# Patient Record
Sex: MALE | Race: WHITE
[De-identification: names, ages, dates, MRNs, and addresses within clinical notes are randomized per-mention and may not be internally consistent; named-entity substitution may affect disease eponyms.]

---

## 2018-05-16 ENCOUNTER — HOSPITAL ENCOUNTER (OUTPATIENT)
Dept: HOSPITAL 74 - FASU | Age: 73
Discharge: HOME | End: 2018-05-16
Attending: OPHTHALMOLOGY
Payer: COMMERCIAL

## 2018-05-16 VITALS — TEMPERATURE: 97.8 F | DIASTOLIC BLOOD PRESSURE: 74 MMHG | SYSTOLIC BLOOD PRESSURE: 116 MMHG

## 2018-05-16 VITALS — HEART RATE: 65 BPM

## 2018-05-16 VITALS — BODY MASS INDEX: 29.2 KG/M2

## 2018-05-16 DIAGNOSIS — H26.8: Primary | ICD-10-CM

## 2018-05-16 PROCEDURE — 08RK3JZ REPLACEMENT OF LEFT LENS WITH SYNTHETIC SUBSTITUTE, PERCUTANEOUS APPROACH: ICD-10-PCS | Performed by: OPHTHALMOLOGY

## 2018-05-16 RX ADMIN — TROPICAMIDE ONE DROP: 10 SOLUTION/ DROPS OPHTHALMIC at 08:00

## 2018-05-16 RX ADMIN — PHENYLEPHRINE HYDROCHLORIDE ONE DROP: 0.25 SPRAY NASAL at 08:10

## 2018-05-16 RX ADMIN — TROPICAMIDE ONE DROP: 10 SOLUTION/ DROPS OPHTHALMIC at 08:10

## 2018-05-16 RX ADMIN — CYCLOPENTOLATE HYDROCHLORIDE ONE DROP: 20 SOLUTION/ DROPS OPHTHALMIC at 08:00

## 2018-05-16 RX ADMIN — PHENYLEPHRINE HYDROCHLORIDE ONE DROP: 0.25 SPRAY NASAL at 08:00

## 2018-05-16 RX ADMIN — CYCLOPENTOLATE HYDROCHLORIDE ONE DROP: 20 SOLUTION/ DROPS OPHTHALMIC at 08:05

## 2018-05-16 RX ADMIN — CIPROFLOXACIN HYDROCHLORIDE ONE DROP: 3 SOLUTION/ DROPS OPHTHALMIC at 08:05

## 2018-05-16 RX ADMIN — CYCLOPENTOLATE HYDROCHLORIDE ONE DROP: 20 SOLUTION/ DROPS OPHTHALMIC at 08:10

## 2018-05-16 RX ADMIN — PHENYLEPHRINE HYDROCHLORIDE ONE DROP: 0.25 SPRAY NASAL at 08:05

## 2018-05-16 RX ADMIN — TROPICAMIDE ONE DROP: 10 SOLUTION/ DROPS OPHTHALMIC at 08:05

## 2018-05-16 RX ADMIN — CIPROFLOXACIN HYDROCHLORIDE ONE DROP: 3 SOLUTION/ DROPS OPHTHALMIC at 08:00

## 2018-05-16 RX ADMIN — CIPROFLOXACIN HYDROCHLORIDE ONE DROP: 3 SOLUTION/ DROPS OPHTHALMIC at 08:10

## 2018-05-16 NOTE — OP
DATE OF OPERATION:  05/16/2018

 

OPERATIVE PROCEDURE:  Lens Phacoemulsification with Posterior Chamber Intraocular

Lens Placement Left Eye

 

PREOPERATIVE DIAGNOSIS:  Visually Significant Cataract of Left Eye

 

POSTOPERATIVE DIAGNOSIS:  Visually Significant Cataract of Left Eye

 

SURGEON:  Derick Luna M.D.

 

ANESTHESIA:  MAC

 

PROCEDURE:  The patient was brought to the operating room and placed under monitored

anesthesia care by Anesthesia.  A drop of Tetracaine was then placed over the left

eye.  The patient was then prepped and draped in the usual sterile manner.  A

speculum was then placed over the left eye. The eye was then well irrigated with

copious amounts of BSS (balanced salt solution). 

 

The operating microscope was then moved into position.  A paracentesis was performed

using a 15 degree blade.  At this point 0.5 mL of 1% preservative-free lidocaine was

injected into the anterior chamber.  Amvisc plus was then injected into the anterior

chamber.  A clear corneal incision was then formed using a 2.2 mm keratome. A

capsulorrhexis was then performed in a continuous circular fashion beginning with a

cystotome, completed with an Utratas forceps. Hydrodissection was then performed

using BSS on a cannula. The phaco probe was then introduced through the corneal wound

and the cataract was removed using the phaco chop technique.  Approximately 3 seconds

of absolute phaco time was used.  The remaining cortex was then removed using

irrigation and aspiration with an I/A probe. The capsule was then filled with regular

Amvisc and the capsule was noted to be intact.  A previously selected foldable

posterior chamber intraocular lens was then injected into the capsule through the

corneal wound using a lens injector.  It was then dialed into position using a

Sinskey hook.  The Amvisc was then removed using irrigation and aspiration.  Miostat

was then injected through the paracentesis to constrict the pupil.  The paracentesis

and corneal wound were then hydrated and noted to be water tight. A drop of Maxitrol

was then placed over the eye.  The speculum was removed and clear shield was taped

over the eye. 

 

The patient tolerated the procedure well and there were no surgical complications.

The patient was asked to follow up in my office the next day.

 

 

DERICK LUNA M.D.

 

[id number]

DD: 05/16/2018 09:59

DT: 05/16/2018 10:33

Job #:  [id number]

## 2018-05-30 ENCOUNTER — HOSPITAL ENCOUNTER (OUTPATIENT)
Dept: HOSPITAL 74 - FASU | Age: 73
Discharge: HOME | End: 2018-05-30
Attending: OPHTHALMOLOGY
Payer: COMMERCIAL

## 2018-05-30 VITALS — SYSTOLIC BLOOD PRESSURE: 119 MMHG | HEART RATE: 64 BPM | DIASTOLIC BLOOD PRESSURE: 68 MMHG

## 2018-05-30 VITALS — BODY MASS INDEX: 29.2 KG/M2

## 2018-05-30 VITALS — TEMPERATURE: 98.1 F

## 2018-05-30 DIAGNOSIS — H26.8: Primary | ICD-10-CM

## 2018-05-30 PROCEDURE — 08RJ3JZ REPLACEMENT OF RIGHT LENS WITH SYNTHETIC SUBSTITUTE, PERCUTANEOUS APPROACH: ICD-10-PCS | Performed by: OPHTHALMOLOGY

## 2018-05-30 RX ADMIN — CYCLOPENTOLATE HYDROCHLORIDE ONE DROP: 20 SOLUTION/ DROPS OPHTHALMIC at 08:05

## 2018-05-30 RX ADMIN — CYCLOPENTOLATE HYDROCHLORIDE ONE DROP: 20 SOLUTION/ DROPS OPHTHALMIC at 08:10

## 2018-05-30 RX ADMIN — TROPICAMIDE ONE DROP: 10 SOLUTION/ DROPS OPHTHALMIC at 08:05

## 2018-05-30 RX ADMIN — TROPICAMIDE ONE DROP: 10 SOLUTION/ DROPS OPHTHALMIC at 08:00

## 2018-05-30 RX ADMIN — PHENYLEPHRINE HYDROCHLORIDE ONE DROP: 0.25 SPRAY NASAL at 08:00

## 2018-05-30 RX ADMIN — TROPICAMIDE ONE DROP: 10 SOLUTION/ DROPS OPHTHALMIC at 08:10

## 2018-05-30 RX ADMIN — CIPROFLOXACIN HYDROCHLORIDE ONE DROP: 3 SOLUTION/ DROPS OPHTHALMIC at 08:00

## 2018-05-30 RX ADMIN — PHENYLEPHRINE HYDROCHLORIDE ONE DROP: 0.25 SPRAY NASAL at 08:05

## 2018-05-30 RX ADMIN — CIPROFLOXACIN HYDROCHLORIDE ONE DROP: 3 SOLUTION/ DROPS OPHTHALMIC at 08:10

## 2018-05-30 RX ADMIN — CIPROFLOXACIN HYDROCHLORIDE ONE DROP: 3 SOLUTION/ DROPS OPHTHALMIC at 08:05

## 2018-05-30 RX ADMIN — PHENYLEPHRINE HYDROCHLORIDE ONE DROP: 0.25 SPRAY NASAL at 08:10

## 2018-05-30 RX ADMIN — CYCLOPENTOLATE HYDROCHLORIDE ONE DROP: 20 SOLUTION/ DROPS OPHTHALMIC at 08:00

## 2018-05-30 NOTE — OP
DATE OF OPERATION:  05/30/2018

 

OPERATIVE PROCEDURE:  Lens phacoemulsification with posterior chamber intraocular

lens placement, right eye.

 

PREOPERATIVE DIAGNOSIS: Visually significant cataract of right eye.

 

POSTOPERATIVE DIAGNOSIS:  Visually significant cataract of right eye.

 

SURGEON:  Derick Luna MD

 

ANESTHESIA:  MAC.

 

PROCEDURE:  The patient was brought to the operating room and placed under monitored

anesthesia care by Anesthesia.  A drop of tetracaine was then placed over the right

eye.  The patient was then prepped and draped in the usual sterile manner.  A

speculum was then placed over the right eye.  The eye was then well irrigated with

copious amounts of BSS (balanced salt solution). 

 

The operating microscope was then moved into position.  A paracentesis was performed

using a 15-degree blade.  At this point 0.5 mL of 1% preservative-free lidocaine was

injected into the anterior chamber.  Amvisc Plus was then injected into the anterior

chamber.  A clear corneal incision was then formed using a 2.2-mm keratome.  A

capsulorrhexis was then performed in a continuous circular fashion beginning with a

cystotome completed with an Utratas forceps. Hydrodissection was then performed using

BSS on a cannula.  The phaco probe was then introduced through the corneal wound and

the cataract was removed using the phaco chop technique.  Approximately 3 seconds of

absolute phaco time was used.  The remaining cortex was then removed using irrigation

and aspiration with an I/A probe.

 

he capsule was then filled with regular Amvisc and the capsule was noted to be

intact.  A previously selected foldable posterior chamber intraocular lens was then

injected into the capsule through the corneal wound using a lens injector.  It was

then dialed into position using a Sinskey hook.  The Amvisc was then removed using

irrigation and aspiration.  Miostat was then injected through the paracentesis to

constrict the pupil.  The paracentesis and corneal wound were then hydrated and noted

to be watertight.  A drop of Maxitrol was then placed over the eye.  The speculum was

removed and clear shield was taped over the eye. 

 

The patient tolerated the procedure well and there were no surgical complications. 

The patient was asked to follow up in my office the next day.

 

 

DERICK LUNA M.D.

 

[id number]

DD: 05/30/2018 10:19

DT: 05/30/2018 11:07

Job #:  [id number]

## 2021-09-01 ENCOUNTER — HOSPITAL ENCOUNTER (OUTPATIENT)
Dept: HOSPITAL 74 - FASU-ENDO | Age: 76
Discharge: HOME | End: 2021-09-01
Attending: INTERNAL MEDICINE
Payer: COMMERCIAL

## 2021-09-01 VITALS — DIASTOLIC BLOOD PRESSURE: 74 MMHG | TEMPERATURE: 97.5 F | SYSTOLIC BLOOD PRESSURE: 113 MMHG

## 2021-09-01 VITALS — BODY MASS INDEX: 29.5 KG/M2

## 2021-09-01 VITALS — HEART RATE: 64 BPM

## 2021-09-01 DIAGNOSIS — Z86.010: Primary | ICD-10-CM

## 2021-09-01 PROCEDURE — 0DJD8ZZ INSPECTION OF LOWER INTESTINAL TRACT, VIA NATURAL OR ARTIFICIAL OPENING ENDOSCOPIC: ICD-10-PCS | Performed by: INTERNAL MEDICINE

## 2021-09-03 PROBLEM — Z00.00 ENCOUNTER FOR PREVENTIVE HEALTH EXAMINATION: Status: ACTIVE | Noted: 2021-09-03

## 2021-09-09 ENCOUNTER — APPOINTMENT (OUTPATIENT)
Dept: CARDIOLOGY | Facility: CLINIC | Age: 76
End: 2021-09-09
Payer: MEDICARE

## 2021-09-09 VITALS
SYSTOLIC BLOOD PRESSURE: 142 MMHG | HEIGHT: 72 IN | WEIGHT: 218 LBS | OXYGEN SATURATION: 96 % | BODY MASS INDEX: 29.53 KG/M2 | DIASTOLIC BLOOD PRESSURE: 78 MMHG | HEART RATE: 75 BPM

## 2021-09-09 DIAGNOSIS — Z83.3 FAMILY HISTORY OF DIABETES MELLITUS: ICD-10-CM

## 2021-09-09 DIAGNOSIS — Z82.49 FAMILY HISTORY OF ISCHEMIC HEART DISEASE AND OTHER DISEASES OF THE CIRCULATORY SYSTEM: ICD-10-CM

## 2021-09-09 DIAGNOSIS — Z78.9 OTHER SPECIFIED HEALTH STATUS: ICD-10-CM

## 2021-09-09 DIAGNOSIS — Z87.438 PERSONAL HISTORY OF OTHER DISEASES OF MALE GENITAL ORGANS: ICD-10-CM

## 2021-09-09 PROCEDURE — 93000 ELECTROCARDIOGRAM COMPLETE: CPT

## 2021-09-09 PROCEDURE — 99204 OFFICE O/P NEW MOD 45 MIN: CPT

## 2021-09-11 ENCOUNTER — NON-APPOINTMENT (OUTPATIENT)
Age: 76
End: 2021-09-11

## 2021-09-11 PROBLEM — Z78.9 SOCIAL ALCOHOL USE: Status: ACTIVE | Noted: 2021-09-11

## 2021-09-11 PROBLEM — Z87.438 HISTORY OF BENIGN PROSTATIC HYPERPLASIA: Status: RESOLVED | Noted: 2021-09-11 | Resolved: 2021-09-11

## 2021-09-11 PROBLEM — Z82.49 FAMILY HISTORY OF PERIPHERAL VASCULAR DISEASE: Status: ACTIVE | Noted: 2021-09-11

## 2021-09-11 PROBLEM — Z82.49 FAMILY HISTORY OF CONGESTIVE HEART FAILURE: Status: ACTIVE | Noted: 2021-09-11

## 2021-09-11 PROBLEM — Z82.49 FAMILY HISTORY OF ACUTE MYOCARDIAL INFARCTION: Status: ACTIVE | Noted: 2021-09-11

## 2021-09-11 PROBLEM — Z83.3 FAMILY HISTORY OF DIABETES MELLITUS: Status: ACTIVE | Noted: 2021-09-11

## 2021-09-11 RX ORDER — ATORVASTATIN CALCIUM 80 MG/1
TABLET, FILM COATED ORAL
Refills: 0 | Status: ACTIVE | COMMUNITY

## 2021-09-11 RX ORDER — MULTIVITAMIN
TABLET ORAL
Refills: 0 | Status: ACTIVE | COMMUNITY

## 2021-09-11 RX ORDER — OMEGA-3/DHA/EPA/FISH OIL 300-1000MG
1000 CAPSULE,DELAYED RELEASE (ENTERIC COATED) ORAL
Refills: 0 | Status: ACTIVE | COMMUNITY

## 2021-09-29 ENCOUNTER — APPOINTMENT (OUTPATIENT)
Dept: CARDIOLOGY | Facility: CLINIC | Age: 76
End: 2021-09-29
Payer: MEDICARE

## 2021-09-29 PROCEDURE — 93015 CV STRESS TEST SUPVJ I&R: CPT

## 2021-09-30 ENCOUNTER — APPOINTMENT (OUTPATIENT)
Dept: CARDIOLOGY | Facility: CLINIC | Age: 76
End: 2021-09-30
Payer: MEDICARE

## 2021-09-30 ENCOUNTER — NON-APPOINTMENT (OUTPATIENT)
Age: 76
End: 2021-09-30

## 2021-09-30 PROCEDURE — 93306 TTE W/DOPPLER COMPLETE: CPT

## 2021-10-03 NOTE — REVIEW OF SYSTEMS
[Feeling Fatigued] : feeling fatigued [Urinary Frequency] : urinary frequency [Joint Pain] : joint pain [Negative] : Heme/Lymph [FreeTextEntry5] : see history of present illness [FreeTextEntry7] : Recent colonoscopy reportedly unremarkable.

## 2021-10-03 NOTE — HISTORY OF PRESENT ILLNESS
[FreeTextEntry1] : 76-year-old man\par Cardiology consultation requested by Dr. Kirkpatrick\par \par Mr. Moss has no known heart disease. There is no history of myocardial infarction angina or congestive heart failure.\par \par Doe does experience occasional dyspnea on exertion. However the symptoms are not progressive or severe. No chest pain. No palpitations No syncope.\par \par \par There is a previous history of hyper lipidemia. There is no history of diabetes hypertension or smoking. His father had  a myocardial infarction ,congestive heart failure and required a pacemaker.His mother had peripheral vascular disease\par \par Mr. Moss presents today for cardiovascular evaluation. He is accompanied by his wife.\par \par Mr. Moss is planning to see Dr. DAMIAN Hillman as a new primary care physician/internist

## 2021-10-03 NOTE — PHYSICAL EXAM
[Normal Conjunctiva] : normal conjunctiva [Normal S1, S2] : normal S1, S2 [Clear Lung Fields] : clear lung fields [Soft] : abdomen soft [Non Tender] : non-tender [Normal Bowel Sounds] : normal bowel sounds [Normal Gait] : normal gait [No Rash] : no rash [No Focal Deficits] : no focal deficits [de-identified] : Appears in no distress lying flat [de-identified] : normocephalic [de-identified] : No neck vein distention. No carotid bruit [de-identified] : 1/6 systolic ejection murmur at the base. No gallop  no diastolic sounds. [de-identified] : Full range of motion [de-identified] : No edema. Dorsalis pedis pulses +2 bilaterally. Feet warm and well-perfused. No ulcerations [de-identified] : pleasant

## 2021-10-03 NOTE — DISCUSSION/SUMMARY
[FreeTextEntry1] : Hyperlipidemia\par Hyperlipidemia represents a risk factor for atherosclerotic heart disease. There is however,  no evidence of such to date. The resting 9/09/21 the electrocardiogram shows no evidence of myocardial ischemia or infarction. The target LDL level for primary prevention is about 100. HMG-CoA reductase inhibitor therapy has been effective. In 9/21 the serum cholesterol level was 155 HDL 43 triglycerides 148 and LDL 87. Nonpharmacological therapy, specifically diet exercise and weight loss are emphasized major aspects of treatment.  In view of risk factors for atherosclerotic heart disease including hyperlipidemia  and family history  screening noninvasive studies would be helpful for management decisions.\par \par I have recommended the following\par a. Low fat low cholesterol diet. Regular aerobic exercise and weight loss\par b. Continue the present medical regimen\par c. Target LDL level to about 100 as discussed above\par d. Noninvasive cardiac evaluation to include\par   1. Echocardiogram\par   2. exercise treadmill ECG study\par \par \par \par \par Shortness of breath\par The working diagnosis is dyspnea on exertion secondary to aging deconditioning and mild obesity. The history is consistent with this diagnosis. The absence of chest pain argues against but does not eliminate myocardial ischemia/atherosclerotic heart disease as a cause for the patient's symptoms. Noninvasive cardiac studies will be helpful for further evaluation.\par \par I have recommended the following\par a. Echocardiogram\par b. Exercise treadmill ECG study.\par \par \par \par \par The diagnosis, prognosis, risks, options and alternatives were explained at length to the patient and family. All questions were answered. Issues discussed included hyperlipidemia family history of heart disease atherosclerotic heart disease hyperlipidemia noninvasive cardiac testing diet and exercise.\par \par Counseling and/or coordination of care\par Time was a significant factor for this patient encounter. Total time spent with the patient and  family was 60 minutes. Greater than 50% of the time was devoted  to counseling and/or coordination of care.

## 2021-10-26 ENCOUNTER — RESULT REVIEW (OUTPATIENT)
Age: 76
End: 2021-10-26

## 2021-10-27 ENCOUNTER — NON-APPOINTMENT (OUTPATIENT)
Age: 76
End: 2021-10-27

## 2022-07-06 ENCOUNTER — NON-APPOINTMENT (OUTPATIENT)
Age: 77
End: 2022-07-06

## 2022-07-06 ENCOUNTER — APPOINTMENT (OUTPATIENT)
Dept: CARDIOLOGY | Facility: CLINIC | Age: 77
End: 2022-07-06

## 2022-07-06 VITALS
SYSTOLIC BLOOD PRESSURE: 110 MMHG | OXYGEN SATURATION: 94 % | DIASTOLIC BLOOD PRESSURE: 70 MMHG | WEIGHT: 212 LBS | BODY MASS INDEX: 28.75 KG/M2 | HEART RATE: 71 BPM

## 2022-07-06 DIAGNOSIS — Z86.69 PERSONAL HISTORY OF OTHER DISEASES OF THE NERVOUS SYSTEM AND SENSE ORGANS: ICD-10-CM

## 2022-07-06 DIAGNOSIS — M50.90 CERVICAL DISC DISORDER, UNSPECIFIED, UNSPECIFIED CERVICAL REGION: ICD-10-CM

## 2022-07-06 PROCEDURE — 93000 ELECTROCARDIOGRAM COMPLETE: CPT

## 2022-07-06 PROCEDURE — 99215 OFFICE O/P EST HI 40 MIN: CPT

## 2022-07-07 PROBLEM — Z86.69 HISTORY OF GLAUCOMA: Status: RESOLVED | Noted: 2022-07-07 | Resolved: 2022-07-07

## 2022-07-07 PROBLEM — M50.90 CERVICAL DISC DISEASE: Status: RESOLVED | Noted: 2022-07-07 | Resolved: 2022-07-07

## 2022-07-07 RX ORDER — LATANOPROST/PF 0.005 %
0.01 DROPS OPHTHALMIC (EYE)
Qty: 2 | Refills: 0 | Status: ACTIVE | COMMUNITY
Start: 2022-05-19

## 2022-07-08 NOTE — DISCUSSION/SUMMARY
[FreeTextEntry1] : Chest pain\par The working diagnosis is musculoskeletal noncardiac chest pain. The history is consistent with this diagnosis. However, the differential diagnosis includes myocardial ischemia/atherosclerotic heart disease. The patient has multiple risk factors for the development of atherosclerotic disease including a strong family history hyperlipidemia and age. A  9/21 exercise treadmill ECG study was abnormal with electrocardiographic evidence of inferolateral myocardial ischemia at peak exercise. This led to a 10/21 stress sestamibi study which revealed normal perfusion.   The degree of aortic stenosis  (9/21 echocardiogram aortic valve area 1.4 cm² ) would t appear  to be inadequate to explain the patient's symptoms Noninvasive cardiac studies at this time would be helpful for management decisions.\par \par I have recommended a followup\par a. Echocardiogram\par b. CT coronary angiogram\par \par \par Valvular heart disease\par The 9/21 echocardiogram revealed moderate aortic stenosis. The peak gradient was 32 mmHg. Mean gradient 21 mmHg aortic valve area 1.4 cm² DVI 0.33. The pulmonary artery systolic pressure was normal at 17 mmHg. The left ventricle ejection fraction was 58%. The present cardiac physical examination is consistent with this degree of aortic stenosis. Echocardiography would be helpful to reassess left ventricular and valvular function.\par \par I have recommended the following\par a. Echocardiogram\par \par \par \par \par \par Hyperlipidemia\par Hyperlipidemia represents a risk factor for atherosclerotic heart disease. There is however,  no evidence of such to date. The resting  7/6/22  electrocardiogram shows no evidence of myocardial ischemia or infarction. The target LDL level for primary prevention is about 100. HMG-CoA reductase inhibitor therapy has been effective. In 9/21 the serum cholesterol level was 155 HDL 43 triglycerides 148 and LDL 87. Nonpharmacological therapy, specifically diet exercise and weight loss are emphasized  as major aspects of treatment.  .\par \par I have recommended the following\par a. Low fat low cholesterol diet. Regular aerobic exercise and weight loss\par b. Continue the present medical regimen\par c. Target LDL level to about 100 as discussed above\par \par \par \par \par \par .\par \par \par \par \par The diagnosis, prognosis, risks, options and alternatives were explained at length to the patient and family. All questions were answered. Issues discussed included  chest pain  hyperlipidemia family history of heart disease atherosclerotic heart disease hyperlipidemia noninvasive cardiac testing diet and exercise.\par \par Counseling and/or coordination of care\par Time was a significant factor for this patient encounter. Total time spent with the patient and  family was 40  minutes. Greater than 50% of the time was devoted  to counseling and/or coordination of care.

## 2022-07-08 NOTE — HISTORY OF PRESENT ILLNESS
[FreeTextEntry1] : 77-year-old man\par Unanticipated visit\sánchez Azar called today with complaints of anterior chest pain. Describes a vague anterior chest discomfort occurring both at rest and exertion. No associated diaphoresis. No dyspnea\par \sánchez Doe is accompanied today by his wife

## 2022-07-08 NOTE — HISTORY OF PRESENT ILLNESS
Call [phone] to establish cardiology follow up within [facility].   [FreeTextEntry1] : 77-year-old man\par Unanticipated visit\sánchez Azar called today with complaints of anterior chest pain. Describes a vague anterior chest discomfort occurring both at rest and exertion. No associated diaphoresis. No dyspnea\par \sánchez Doe is accompanied today by his wife

## 2022-07-08 NOTE — REVIEW OF SYSTEMS
[Feeling Fatigued] : feeling fatigued [Chest Discomfort] : chest discomfort [Joint Pain] : joint pain [Negative] : Heme/Lymph [FreeTextEntry3] : glasses [FreeTextEntry5] : see history of present illness

## 2022-07-08 NOTE — PHYSICAL EXAM
[Normal Conjunctiva] : normal conjunctiva [Normal S1, S2] : normal S1, S2 [Clear Lung Fields] : clear lung fields [Soft] : abdomen soft [Non Tender] : non-tender [Normal Bowel Sounds] : normal bowel sounds [Normal Gait] : normal gait [No Rash] : no rash [No Focal Deficits] : no focal deficits [de-identified] : Appears in no distress lying flat [de-identified] : No neck vein distention. No carotid bruit [de-identified] : normocephalic [de-identified] : 1-2 /6 systolic ejection murmur at the base. No gallop  no diastolic sounds. [de-identified] : Full range of motion [de-identified] : No edema. Dorsalis pedis pulses +2 bilaterally. Feet warm and well-perfused. No ulcerations [de-identified] : pleasant

## 2022-07-08 NOTE — PHYSICAL EXAM
[Normal Conjunctiva] : normal conjunctiva [Clear Lung Fields] : clear lung fields [Normal S1, S2] : normal S1, S2 [Soft] : abdomen soft [Non Tender] : non-tender [Normal Bowel Sounds] : normal bowel sounds [Normal Gait] : normal gait [No Rash] : no rash [No Focal Deficits] : no focal deficits [de-identified] : Appears in no distress lying flat [de-identified] : No neck vein distention. No carotid bruit [de-identified] : normocephalic [de-identified] : 1-2 /6 systolic ejection murmur at the base. No gallop  no diastolic sounds. [de-identified] : Full range of motion [de-identified] : No edema. Dorsalis pedis pulses +2 bilaterally. Feet warm and well-perfused. No ulcerations [de-identified] : pleasant

## 2022-07-21 ENCOUNTER — TRANSCRIPTION ENCOUNTER (OUTPATIENT)
Age: 77
End: 2022-07-21

## 2022-08-01 ENCOUNTER — APPOINTMENT (OUTPATIENT)
Dept: CARDIOLOGY | Facility: CLINIC | Age: 77
End: 2022-08-01

## 2022-08-01 PROCEDURE — 93306 TTE W/DOPPLER COMPLETE: CPT

## 2022-08-01 PROCEDURE — 36415 COLL VENOUS BLD VENIPUNCTURE: CPT

## 2022-08-07 LAB
ANION GAP SERPL CALC-SCNC: 14 MMOL/L
BASOPHILS # BLD AUTO: 0.05 K/UL
BASOPHILS NFR BLD AUTO: 0.7 %
BUN SERPL-MCNC: 17 MG/DL
CALCIUM SERPL-MCNC: 9.7 MG/DL
CHLORIDE SERPL-SCNC: 104 MMOL/L
CHOLEST SERPL-MCNC: 172 MG/DL
CO2 SERPL-SCNC: 22 MMOL/L
CREAT SERPL-MCNC: 1.22 MG/DL
EGFR: 62 ML/MIN/1.73M2
EOSINOPHIL # BLD AUTO: 0.23 K/UL
EOSINOPHIL NFR BLD AUTO: 3.3 %
GLUCOSE SERPL-MCNC: 95 MG/DL
HCT VFR BLD CALC: 45.2 %
HDLC SERPL-MCNC: 49 MG/DL
HGB BLD-MCNC: 14.4 G/DL
IMM GRANULOCYTES NFR BLD AUTO: 0.1 %
LDLC SERPL CALC-MCNC: 96 MG/DL
LYMPHOCYTES # BLD AUTO: 3.59 K/UL
LYMPHOCYTES NFR BLD AUTO: 52.1 %
MAN DIFF?: NORMAL
MCHC RBC-ENTMCNC: 31 PG
MCHC RBC-ENTMCNC: 31.9 GM/DL
MCV RBC AUTO: 97.4 FL
MONOCYTES # BLD AUTO: 0.48 K/UL
MONOCYTES NFR BLD AUTO: 7 %
NEUTROPHILS # BLD AUTO: 2.53 K/UL
NEUTROPHILS NFR BLD AUTO: 36.8 %
NONHDLC SERPL-MCNC: 124 MG/DL
PLATELET # BLD AUTO: 291 K/UL
POTASSIUM SERPL-SCNC: 5.2 MMOL/L
RBC # BLD: 4.64 M/UL
RBC # FLD: 14 %
SODIUM SERPL-SCNC: 141 MMOL/L
TRIGL SERPL-MCNC: 139 MG/DL
WBC # FLD AUTO: 6.89 K/UL

## 2022-08-24 ENCOUNTER — RESULT REVIEW (OUTPATIENT)
Age: 77
End: 2022-08-24

## 2022-08-25 ENCOUNTER — RESULT REVIEW (OUTPATIENT)
Age: 77
End: 2022-08-25

## 2022-08-31 ENCOUNTER — APPOINTMENT (OUTPATIENT)
Dept: CARDIOLOGY | Facility: CLINIC | Age: 77
End: 2022-08-31

## 2022-08-31 VITALS
HEART RATE: 68 BPM | WEIGHT: 214 LBS | DIASTOLIC BLOOD PRESSURE: 68 MMHG | BODY MASS INDEX: 28.99 KG/M2 | HEIGHT: 72 IN | SYSTOLIC BLOOD PRESSURE: 121 MMHG | OXYGEN SATURATION: 96 %

## 2022-08-31 PROCEDURE — 99214 OFFICE O/P EST MOD 30 MIN: CPT

## 2022-09-02 RX ORDER — ASPIRIN 81 MG
81 TABLET, DELAYED RELEASE (ENTERIC COATED) ORAL
Refills: 0 | Status: ACTIVE | COMMUNITY

## 2022-09-02 NOTE — PHYSICAL EXAM
[Normal Conjunctiva] : normal conjunctiva [Normal S1, S2] : normal S1, S2 [Clear Lung Fields] : clear lung fields [Soft] : abdomen soft [Non Tender] : non-tender [Normal Bowel Sounds] : normal bowel sounds [Normal Gait] : normal gait [No Rash] : no rash [No Focal Deficits] : no focal deficits [de-identified] : Appears in no distress lying flat [de-identified] : normocephalic [de-identified] : No neck vein distention. No carotid bruit [de-identified] : 1-2 /6  mid-peaking  systolic ejection murmur at the base. No gallop  no diastolic sounds. [de-identified] : Full range of motion [de-identified] : No edema. Dorsalis pedis pulses +2 bilaterally. Feet warm and well-perfused. No ulcerations [de-identified] : pleasant

## 2022-09-02 NOTE — HISTORY OF PRESENT ILLNESS
[FreeTextEntry1] : 77-year-old man\par Routine followup\par "I feel okay." Doe denies chest pain, shortness of breath, palpitations or syncope. He is physically active without restriction or limitation.\par \par Mr. Moss is accompanied today by his wife

## 2022-09-02 NOTE — DISCUSSION/SUMMARY
[FreeTextEntry1] : \par Atherosclerotic heart disease\par Atherosclerotic heart disease is stable. The 8/22 CT coronary angiogram demonstrated  revealed a total coronary calcium score of 898 Agatston  units representing 90 percentile. The left main had less than 25% stenosis LAD proximal 30-49% mid 50-69% and distal 30-49% left circumflex and RCA had 1-29% proximal stenosis.  FFR (fractional flow reserve) was negative for all vessels. \par  Left ventricular  systolic function is  normal  as reflected by a left ventricular ejection fraction of 61% on the 8/22 echocardiogram. The resting 7/22 electrocardiogram shows no evidence of myocardial ischemia or infarction. In view of the lack of angina, above noninvasive studies and clinical course continued medical management is indicated at this time. Measures to control modifiable risk factors for the development of atherosclerotic disease will be important in long-term management.\par \par \par I have recommended the following\par a. Risk factor modification\par b. Continue  the present medical regimen with the addition of aspirin 81 mg /day\par c. No further cardiac testing for this problem at this time\par \par \par \par Valvular heart disease\par The  8/22  echocardiogram revealed moderate aortic stenosis. The peak gradient was 35 mmHg. Mean gradient 21 mmHg aortic valve area 1.15 cm² DVI 0.28. The pulmonary artery systolic pressure was normal at 20  mmHg. The left ventricle ejection fraction was 61%. The present cardiac physical examination is consistent with this degree of aortic stenosis. .\par I view of the lack of symptoms, absence of heart failure and clinical course,  continued monitoring without intervention is indicated at this time\par \par I have recommended the following\par a.No further cardiac testing for this problem at this time\par b anticipate repeat echocardiogram 2023\par \par \par \par \par \par Hyperlipidemia\par Hyperlipidemia represents a risk factor for progressive  atherosclerotic heart disease. The target LDL level with known atherosclerotic heart disease  is  about 70  In 8/22 the serum cholesterol level was 172 triglycerides 139 HDL 49 LDL 96. . Nonpharmacological therapy, specifically diet exercise and weight loss are emphasized  as major aspects of treatment.  .\par \par I have recommended the following\par a. Low fat low cholesterol diet. Regular aerobic exercise and weight loss\par b. Increase losartan dose to 20 mg/day\par c. Target LDL level to about 100 as discussed above\par \par \par \par \par \par The diagnosis, prognosis, risks, options and alternatives were explained at length to the patient and family. All questions were answered. Issues discussed included  atherosclerotic heart disease   coronary revascularization procedures aortic valve replacement  hyperlipidemia family history of heart disease  aortic stenosis   noninvasive cardiac testing diet and exercise.\par \par Counseling and/or coordination of care\par Time was a significant factor for this patient encounter. Total time spent with the patient and  family was 30   minutes. Greater than 50% of the time was devoted  to counseling and/or coordination of care.

## 2023-02-28 RX ORDER — ATORVASTATIN CALCIUM 20 MG/1
20 TABLET, FILM COATED ORAL DAILY
Qty: 90 | Refills: 3 | Status: ACTIVE | COMMUNITY
Start: 2023-02-28 | End: 1900-01-01

## 2023-05-02 ENCOUNTER — APPOINTMENT (OUTPATIENT)
Dept: CARDIOLOGY | Facility: CLINIC | Age: 78
End: 2023-05-02
Payer: MEDICARE

## 2023-05-02 ENCOUNTER — NON-APPOINTMENT (OUTPATIENT)
Age: 78
End: 2023-05-02

## 2023-05-02 VITALS
BODY MASS INDEX: 28.99 KG/M2 | HEART RATE: 74 BPM | DIASTOLIC BLOOD PRESSURE: 69 MMHG | SYSTOLIC BLOOD PRESSURE: 117 MMHG | OXYGEN SATURATION: 98 % | HEIGHT: 72 IN | WEIGHT: 214 LBS

## 2023-05-02 PROCEDURE — 93000 ELECTROCARDIOGRAM COMPLETE: CPT

## 2023-05-02 PROCEDURE — 99214 OFFICE O/P EST MOD 30 MIN: CPT

## 2023-05-06 NOTE — PHYSICAL EXAM
[Normal Conjunctiva] : normal conjunctiva [Normal S1, S2] : normal S1, S2 [Clear Lung Fields] : clear lung fields [Soft] : abdomen soft [Non Tender] : non-tender [Normal Bowel Sounds] : normal bowel sounds [Normal Gait] : normal gait [No Rash] : no rash [No Focal Deficits] : no focal deficits [de-identified] : normocephalic [de-identified] : Appears in no distress lying flat [de-identified] : No neck vein distention. No carotid bruit [de-identified] : 1-2 /6  mid-peaking  systolic ejection murmur at the base. No gallop  no diastolic sounds. [de-identified] : No edema. Dorsalis pedis pulses +2 bilaterally. Feet warm and well-perfused. No ulcerations [de-identified] : Full range of motion [de-identified] : pleasant

## 2023-05-06 NOTE — DISCUSSION/SUMMARY
[FreeTextEntry1] : Atherosclerotic heart disease\par Atherosclerotic heart disease is stable. The 8/22 CT coronary angiogram demonstrated  revealed a total coronary calcium score of 898 Agatston  units representing 90 percentile. The left main had less than 25% stenosis LAD proximal 30-49% mid 50-69% and distal 30-49% left circumflex and RCA had 1-29% proximal stenosis.  FFR (fractional flow reserve) was negative for all vessels. \par  Left ventricular  systolic function is  normal  as reflected by a left ventricular ejection fraction of 61% on the 8/22 echocardiogram. . In view of the lack of angina, above noninvasive studies and clinical course continued medical management is indicated at this time. Measures to control modifiable risk factors for the development of atherosclerotic disease will be important in long-term management.\par \par \par I have recommended the following\par a. Risk factor modification\par b. Continue  the present medical regimen \par c. No further cardiac testing for this problem at this time\par \par \par \par Valvular heart disease\par The  8/22  echocardiogram revealed moderate aortic stenosis. The peak gradient was 35 mmHg. Mean gradient 21 mmHg aortic valve area 1.15 cm² DVI 0.28. The pulmonary artery systolic pressure was normal at 20  mmHg. The left ventricle ejection fraction was 61%. The present cardiac physical examination is consistent with this degree of aortic stenosis. .\par I view of the lack of symptoms, absence of heart failure and clinical course,  continued monitoring without intervention is indicated at this time\par \par I have recommended the following\par a.No further cardiac testing for this problem at this time\par b echocardiogram with next office evaluation in 6 months\par \par \par \par \par Hyperlipidemia\par Hyperlipidemia represents a risk factor for progressive  atherosclerotic heart disease. The target LDL level with known atherosclerotic heart disease  is  about 70  In 8/22 the serum cholesterol level was 172 triglycerides 139 HDL 49 LDL 96. .  The dose of atorvastatin was increased from 10 mg/day to 20 mg a day in an effort to reduce LDL levels further.  However lab test abnormalities (?  Liver function tests) not available at this time led to a recommendation by Dr. Sanchez to revert back to 10 mg/day.  Nonpharmacological therapy, specifically diet exercise and weight loss are emphasized  as major aspects of treatment.  .\par \par I have recommended the following\par a. Low fat low cholesterol diet. Regular aerobic exercise and weight loss\par b.  Target LDL level to about 70 as discussed above\par c.  Lab data/medical records not available at this time are requested for review\par d.  Consideration for Zetia to be added to the present medical regimen and/or alternative statin therapy if atorvastatin is thought to be responsible for the laboratory study abnormalities.\par \par \par \par \par \par The diagnosis, prognosis, risks, options and alternatives were explained at length to the patient  All questions were answered. Issues discussed included laboratory test abnormalities atherosclerotic heart disease   coronary revascularization procedures aortic valve replacement  hyperlipidemia family history of heart disease  aortic stenosis   noninvasive cardiac testing diet and exercise.\par \par Counseling and/or coordination of care\par Time was a significant factor for this patient encounter. Total time spent with the patient and  family was 30   minutes. Greater than 50% of the time was devoted  to counseling and/or coordination of care.

## 2023-05-06 NOTE — REVIEW OF SYSTEMS
[Feeling Fatigued] : feeling fatigued [Negative] : Psychiatric [FreeTextEntry5] : See history of present illness

## 2023-05-06 NOTE — HISTORY OF PRESENT ILLNESS
[FreeTextEntry1] : 78-year-old man\par Routine follow-up\par "I feel okay."  Doe denies chest pain, shortness of breath, palpitations or syncope.  He is physically active without restriction or limitation.  No ankle edema.  No orthopnea.

## 2023-09-13 RX ORDER — ATORVASTATIN CALCIUM 20 MG/1
20 TABLET, FILM COATED ORAL DAILY
Qty: 90 | Refills: 3 | Status: COMPLETED | COMMUNITY
Start: 2022-08-31 | End: 2023-09-13

## 2023-09-25 ENCOUNTER — RESULT REVIEW (OUTPATIENT)
Age: 78
End: 2023-09-25

## 2023-10-02 ENCOUNTER — NON-APPOINTMENT (OUTPATIENT)
Age: 78
End: 2023-10-02

## 2023-10-03 ENCOUNTER — NON-APPOINTMENT (OUTPATIENT)
Age: 78
End: 2023-10-03

## 2023-10-03 ENCOUNTER — APPOINTMENT (OUTPATIENT)
Dept: CARDIOLOGY | Facility: CLINIC | Age: 78
End: 2023-10-03
Payer: MEDICARE

## 2023-10-03 VITALS
SYSTOLIC BLOOD PRESSURE: 109 MMHG | BODY MASS INDEX: 28.99 KG/M2 | WEIGHT: 214 LBS | DIASTOLIC BLOOD PRESSURE: 54 MMHG | HEART RATE: 75 BPM | OXYGEN SATURATION: 95 % | HEIGHT: 72 IN

## 2023-10-03 DIAGNOSIS — Z86.79 PERSONAL HISTORY OF OTHER DISEASES OF THE CIRCULATORY SYSTEM: ICD-10-CM

## 2023-10-03 DIAGNOSIS — Z86.39 PERSONAL HISTORY OF OTHER ENDOCRINE, NUTRITIONAL AND METABOLIC DISEASE: ICD-10-CM

## 2023-10-03 PROCEDURE — 93000 ELECTROCARDIOGRAM COMPLETE: CPT | Mod: 59

## 2023-10-03 PROCEDURE — 93246 EXT ECG>7D<15D RECORDING: CPT

## 2023-10-03 PROCEDURE — 99215 OFFICE O/P EST HI 40 MIN: CPT | Mod: 25

## 2023-10-03 RX ORDER — IBUPROFEN 200 MG/1
TABLET, COATED ORAL
Refills: 0 | Status: ACTIVE | COMMUNITY

## 2023-10-03 RX ORDER — NAPROXEN SODIUM 220 MG
TABLET ORAL
Refills: 0 | Status: ACTIVE | COMMUNITY

## 2023-10-04 ENCOUNTER — NON-APPOINTMENT (OUTPATIENT)
Age: 78
End: 2023-10-04

## 2023-10-28 ENCOUNTER — NON-APPOINTMENT (OUTPATIENT)
Age: 78
End: 2023-10-28

## 2023-10-28 PROCEDURE — 93248 EXT ECG>7D<15D REV&INTERPJ: CPT

## 2024-05-14 ENCOUNTER — APPOINTMENT (OUTPATIENT)
Dept: CARDIOLOGY | Facility: CLINIC | Age: 79
End: 2024-05-14
Payer: MEDICARE

## 2024-05-14 PROCEDURE — 93306 TTE W/DOPPLER COMPLETE: CPT

## 2024-05-17 DIAGNOSIS — Z86.79 PERSONAL HISTORY OF OTHER DISEASES OF THE CIRCULATORY SYSTEM: ICD-10-CM

## 2024-05-21 ENCOUNTER — NON-APPOINTMENT (OUTPATIENT)
Age: 79
End: 2024-05-21

## 2024-05-21 ENCOUNTER — APPOINTMENT (OUTPATIENT)
Dept: CARDIOLOGY | Facility: CLINIC | Age: 79
End: 2024-05-21
Payer: MEDICARE

## 2024-05-21 VITALS
WEIGHT: 219 LBS | OXYGEN SATURATION: 99 % | HEART RATE: 76 BPM | DIASTOLIC BLOOD PRESSURE: 80 MMHG | SYSTOLIC BLOOD PRESSURE: 138 MMHG | BODY MASS INDEX: 29.7 KG/M2

## 2024-05-21 DIAGNOSIS — I44.1 ATRIOVENTRICULAR BLOCK, SECOND DEGREE: ICD-10-CM

## 2024-05-21 DIAGNOSIS — E78.5 HYPERLIPIDEMIA, UNSPECIFIED: ICD-10-CM

## 2024-05-21 DIAGNOSIS — I25.10 ATHEROSCLEROTIC HEART DISEASE OF NATIVE CORONARY ARTERY W/OUT ANGINA PECTORIS: ICD-10-CM

## 2024-05-21 DIAGNOSIS — I49.1 ATRIAL PREMATURE DEPOLARIZATION: ICD-10-CM

## 2024-05-21 DIAGNOSIS — I35.0 NONRHEUMATIC AORTIC (VALVE) STENOSIS: ICD-10-CM

## 2024-05-21 DIAGNOSIS — I38 ENDOCARDITIS, VALVE UNSPECIFIED: ICD-10-CM

## 2024-05-21 DIAGNOSIS — Z86.79 PERSONAL HISTORY OF OTHER DISEASES OF THE CIRCULATORY SYSTEM: ICD-10-CM

## 2024-05-21 PROCEDURE — 93000 ELECTROCARDIOGRAM COMPLETE: CPT

## 2024-05-21 PROCEDURE — 99214 OFFICE O/P EST MOD 30 MIN: CPT

## 2024-05-27 PROBLEM — I25.10 ATHEROSCLEROTIC HEART DISEASE: Status: ACTIVE | Noted: 2021-09-29

## 2024-05-27 PROBLEM — Z86.79 HISTORY OF SECOND DEGREE HEART BLOCK: Status: RESOLVED | Noted: 2024-05-27 | Resolved: 2024-05-27

## 2024-05-27 PROBLEM — I49.1 ECTOPIC ATRIAL RHYTHM: Status: ACTIVE | Noted: 2024-05-27

## 2024-05-27 PROBLEM — I44.1 SECOND DEGREE AV BLOCK: Status: ACTIVE | Noted: 2023-10-03

## 2024-05-27 PROBLEM — I38 VALVULAR HEART DISEASE: Status: ACTIVE | Noted: 2022-07-07

## 2024-05-27 PROBLEM — I35.0 AORTIC STENOSIS: Status: ACTIVE | Noted: 2022-07-07

## 2024-05-27 PROBLEM — E78.5 HYPERLIPIDEMIA: Status: ACTIVE | Noted: 2021-09-09

## 2024-05-27 RX ORDER — ATORVASTATIN CALCIUM 80 MG/1
TABLET, FILM COATED ORAL
Refills: 0 | Status: ACTIVE | COMMUNITY

## 2024-05-27 NOTE — DISCUSSION/SUMMARY
[FreeTextEntry1] : Atherosclerotic heart disease Atherosclerotic heart disease is stable. The 8/22 CT coronary angiogram demonstrated  revealed a total coronary calcium score of 898 Agatston  units representing 90 percentile. The left main had less than 25% stenosis LAD proximal 30-49% mid 50-69% and distal 30-49% left circumflex and RCA had 1-29% proximal stenosis.  FFR (fractional flow reserve) was negative for all vessels.  The resting 5/24 electrocardiogram shows no evidence of myocardial ischemia or infarction.  Left ventricular  systolic function is  normal  as reflected by a left ventricular ejection fraction of  54 % on the  5/24  echocardiogram. . In view of the lack of angina, above noninvasive studies and clinical course continued medical management is indicated at this time. Measures to control modifiable risk factors for the development of atherosclerotic disease will be important in long-term management.   I have recommended the following a. Risk factor modification b. Continue  the present medical regimen  c. No further cardiac testing for this problem at this time    Valvular heart disease /Aortic stenosis  The 9/23 echocardiogram revealed moderate aortic stenosis.  The peak gradient was 42 mmHg the mean gradient 22 mmHg and aortic valve area 0.9 cm.  .  The left ventricular ejection fraction was 53%.  The 5/24 echocardiogram again revealed moderate-severe  aortic stenosis.  The peak gradient was 66 mmHg mean gradient 33 mmHg and aortic valve area of 1.1 7  cm.  Mild tricuspid regurgitation was reported.  The pulmonary artery systolic pressure was normal at 25 mmHg.  The left ventricular ejection fraction was 54%. The present cardiac physical examination is consistent with this degree of aortic stenosis. . In view of the lack of symptoms, absence of heart failure and clinical course,  continued monitoring without intervention is indicated at this time  I have recommended the following a. No further cardiac testing for this problem at this time b echocardiogram with next office evaluation in 6 months     Hyperlipidemia Hyperlipidemia represents a risk factor for progressive  atherosclerotic heart disease. The target LDL level with known atherosclerotic heart disease  is  about 70  [facility] [employer] a reductase inhibitor therapy has been effective.  In 9/23 the serum cholesterol level was 163 triglycerides 132 HDL 48 and LDL 91.More recent lipid levels are not available for review  The dose of atorvastatin may be increased if required to obtain more optimal LDL levels.  Nonpharmacological therapy, specifically diet exercise and weight loss are emphasized  as major aspects of treatment.  .  I have recommended the following a. Low fat low cholesterol diet. Regular aerobic exercise and weight loss b.  Target LDL level to about 70 as discussed above  c   Continue the present medical regimen at this time d Routine laboratory studies including lipid profile through primary care Dr. VIVIANE Sanchez e   Increase atorvastatin dose if required to obtain more optimal LDL levels as  assessed by the next laboratory studies  .  Second-degree AV block/Ectopic atrial rhythm 10/3/24 electrocardiogram  revealed sinus rhythm with prolonged AV conduction and second-degree AV block.  In the past the patient has had a long first-degree AV block with a ME interval of 320 ms but no evidence of heart block.   A 10/23 Zio patch 2-week mobile telemetry study showed sinus rhythm /minute.  Second-degree Mobitz 1 AV block was noted during hours of sleep.  No symptoms were recorded.  No arrhythmia was seen.  Findings of conduction abnormalities in the setting of aortic valve disease is not uncommon. Electrocardiogram today reveals an ectopic atrial rhythm representing a new benign rhythm disturbance requiring no intervention. Permanent pacemaker implantation is not indicated in the absence of symptomatic bradycardia or high degree AV block.  I have recommended the following No further cardiac testing for this problem at this time Avoidance of AV ruperto blocking agents e.g. beta-blockers and certain calcium entry blocker therapy(diltiazem/verapamil)     The diagnosis, prognosis, risks, options and alternatives were explained at length to the patient and family   All questions were answered. Issues discussed included laboratory test abnormalities atherosclerotic heart disease   coronary revascularization procedures aortic valve replacement  hyperlipidemia family history of heart disease  aortic stenosis   noninvasive cardiac testing diet and exercise. . Counseling and/or coordination of care Time was a significant factor for this patient encounter. Total time spent with the patient was  30   minutes. Greater than 50% of the time was devoted  to counseling and/or coordination of care.

## 2024-05-27 NOTE — PHYSICAL EXAM
[Normal Conjunctiva] : normal conjunctiva [Normal S1, S2] : normal S1, S2 [Clear Lung Fields] : clear lung fields [Soft] : abdomen soft [Non Tender] : non-tender [Normal Bowel Sounds] : normal bowel sounds [Normal Gait] : normal gait [No Rash] : no rash [No Focal Deficits] : no focal deficits [de-identified] : Appears in no distress lying flat [de-identified] : normocephalic [de-identified] : No neck vein distention. No carotid bruit [de-identified] : 1-2 /6  mid-peaking  systolic ejection murmur at the base. No gallop  no diastolic sounds. [de-identified] : Full range of motion [de-identified] : No edema. Dorsalis pedis pulses +2 bilaterally. Feet warm and well-perfused. No ulcerations [de-identified] : pleasant

## 2024-05-27 NOTE — HISTORY OF PRESENT ILLNESS
[FreeTextEntry1] : 79-year-old man Routine follow-up for valvular/atherosclerotic heart disease hyperlipidemia second-degree AV block  "I feel okay."  Occasional dyspnea on exertion is not progressive or severe.  No chest pain.  No ankle edema.  No orthopnea.  No dizziness.  No syncope.  Doe is accompanied today by his wife

## 2024-09-26 ENCOUNTER — APPOINTMENT (OUTPATIENT)
Dept: CARDIOLOGY | Facility: CLINIC | Age: 79
End: 2024-09-26
Payer: MEDICARE

## 2024-09-26 PROCEDURE — 93306 TTE W/DOPPLER COMPLETE: CPT

## 2024-09-27 DIAGNOSIS — Z86.79 PERSONAL HISTORY OF OTHER DISEASES OF THE CIRCULATORY SYSTEM: ICD-10-CM

## 2024-09-29 ENCOUNTER — NON-APPOINTMENT (OUTPATIENT)
Age: 79
End: 2024-09-29

## 2024-09-30 ENCOUNTER — APPOINTMENT (OUTPATIENT)
Dept: CARDIOLOGY | Facility: CLINIC | Age: 79
End: 2024-09-30
Payer: MEDICARE

## 2024-09-30 VITALS
SYSTOLIC BLOOD PRESSURE: 122 MMHG | HEIGHT: 72 IN | WEIGHT: 214 LBS | DIASTOLIC BLOOD PRESSURE: 76 MMHG | BODY MASS INDEX: 28.99 KG/M2 | HEART RATE: 77 BPM | OXYGEN SATURATION: 93 %

## 2024-09-30 DIAGNOSIS — I35.0 NONRHEUMATIC AORTIC (VALVE) STENOSIS: ICD-10-CM

## 2024-09-30 DIAGNOSIS — I44.1 ATRIOVENTRICULAR BLOCK, SECOND DEGREE: ICD-10-CM

## 2024-09-30 DIAGNOSIS — I44.0 ATRIOVENTRICULAR BLOCK, FIRST DEGREE: ICD-10-CM

## 2024-09-30 DIAGNOSIS — I38 ENDOCARDITIS, VALVE UNSPECIFIED: ICD-10-CM

## 2024-09-30 DIAGNOSIS — I49.1 ATRIAL PREMATURE DEPOLARIZATION: ICD-10-CM

## 2024-09-30 DIAGNOSIS — E78.5 HYPERLIPIDEMIA, UNSPECIFIED: ICD-10-CM

## 2024-09-30 DIAGNOSIS — U07.1 COVID-19: ICD-10-CM

## 2024-09-30 DIAGNOSIS — I25.10 ATHEROSCLEROTIC HEART DISEASE OF NATIVE CORONARY ARTERY W/OUT ANGINA PECTORIS: ICD-10-CM

## 2024-09-30 PROCEDURE — 99214 OFFICE O/P EST MOD 30 MIN: CPT

## 2024-09-30 PROCEDURE — 93000 ELECTROCARDIOGRAM COMPLETE: CPT

## 2024-10-05 ENCOUNTER — NON-APPOINTMENT (OUTPATIENT)
Age: 79
End: 2024-10-05

## 2024-10-05 NOTE — DISCUSSION/SUMMARY
[FreeTextEntry1] : Atherosclerotic heart disease Atherosclerotic heart disease is stable. The 8/22 CT coronary angiogram   revealed a total coronary calcium score of 898 Agatston  units representing 90 percentile. The left main had less than 25% stenosis LAD proximal 30-49% mid 50-69% and distal 30-49% left circumflex and RCA had 1-29% proximal stenosis.  FFR (fractional flow reserve) was negative for all vessels.  The resting  9/24  electrocardiogram shows no evidence of myocardial ischemia or infarction.  Left ventricular  systolic function is  normal  as reflected by a left ventricular ejection fraction of  56 % on the  9/24  echocardiogram. . In view of the lack of angina, above noninvasive studies and clinical course continued medical management is indicated at this time. Measures to control modifiable risk factors for the development of atherosclerotic disease will be important in long-term management.   I have recommended the following a. Risk factor modification b. Continue  the present medical regimen  c. No further cardiac testing for this problem at this time    Valvular heart disease /Aortic stenosis  The 9/23 echocardiogram revealed moderate aortic stenosis.  The peak gradient was 42 mmHg the mean gradient 22 mmHg and aortic valve area 0.9 cm.  .  The left ventricular ejection fraction was 53%.  The 9/24 echocardiogram again  showed  moderate  aortic stenosis.  The peak gradient was 62 mmHg mean gradient 36 mmHg and aortic valve area of 1.03  cm.    The pulmonary artery systolic pressure was normal at 15 mmHg.  The left ventricular ejection fraction was 56%. The present cardiac physical examination is consistent with this degree of aortic stenosis. . In view of the lack of symptoms, absence of heart failure and clinical course,  continued monitoring without intervention is indicated at this time  I have recommended the following a. No further cardiac testing for this problem at this time      Hyperlipidemia Hyperlipidemia represents a risk factor for progressive  atherosclerotic heart disease. The target LDL level with known atherosclerotic heart disease  is  about 70  [facility] [employer] a reductase inhibitor therapy has been effective.  In 9/23 the serum cholesterol level was 163 triglycerides 132 HDL 48 and LDL 91.More recent lipid levels are not available for review  The dose of atorvastatin may be increased if required to obtain more optimal LDL levels.  Nonpharmacological therapy, specifically diet exercise and weight loss are emphasized  as major aspects of treatment.  .  I have recommended the following a. Low fat low cholesterol diet. Regular aerobic exercise and weight loss b.  Target LDL level to about 70 as discussed above  c   Continue the present medical regimen at this time d Routine laboratory studies including lipid profile through primary care Dr. VIVIANE martin   Increase atorvastatin dose if required to obtain more optimal LDL levels as  assessed by the next laboratory studies  .  Second-degree AV block/Ectopic atrial rhythm/  first-degree AV block-prolonged AV conduction The 10/3/24 electrocardiogram  revealed sinus rhythm with prolonged AV conduction and second-degree AV block.  In the past the patient has had a long first-degree AV block with a MO interval of 320 ms but no evidence of heart block.   A 10/23 Zio patch 2-week mobile telemetry study showed sinus rhythm /minute.  Second-degree Mobitz 1 AV block was noted during hours of sleep.  No symptoms were recorded.  No arrhythmia was seen.  Findings of conduction abnormalities in the setting of aortic valve disease is not uncommon. The 5/21/24 electrocardiogram  reveals an ectopic atrial rhythm representing a new benign rhythm disturbance requiring no intervention. 9/30/2024 electrocardiogram shows sinus rhythm with prolonged AV conduction /first-degree AV block Permanent pacemaker implantation is not indicated in the absence of symptomatic bradycardia or high degree AV block.  I have recommended the following No further cardiac testing for this problem at this time Avoidance of AV ruperto blocking agents e.g. beta-blockers and certain calcium entry blocker therapy(diltiazem/verapamil)     The diagnosis, prognosis, risks, options and alternatives were explained at length to the patient .   All questions were answered. Issues discussed included laboratory test abnormalities atherosclerotic heart disease   coronary revascularization procedures aortic valve replacement  hyperlipidemia family history of heart disease  aortic stenosis   noninvasive cardiac testing diet and exercise. . Counseling and/or coordination of care Time was a significant factor for this patient encounter. Total time spent with the patient was  30   minutes. Greater than 50% of the time was devoted  to counseling and/or coordination of care.

## 2024-10-05 NOTE — PHYSICAL EXAM
[Normal Conjunctiva] : normal conjunctiva [Normal S1, S2] : normal S1, S2 [Clear Lung Fields] : clear lung fields [Soft] : abdomen soft [Non Tender] : non-tender [Normal Bowel Sounds] : normal bowel sounds [Normal Gait] : normal gait [No Rash] : no rash [No Focal Deficits] : no focal deficits [de-identified] : Appears in no distress lying flat [de-identified] : normocephalic [de-identified] : 1-2 /6  mid-peaking  systolic ejection murmur at the base. No gallop  no diastolic sounds. [de-identified] : No neck vein distention. No carotid bruit [de-identified] : Full range of motion [de-identified] : No edema. Dorsalis pedis pulses +2 bilaterally. Feet warm and well-perfused. No ulcerations [de-identified] : pleasant

## 2024-10-05 NOTE — DISCUSSION/SUMMARY
[FreeTextEntry1] : Atherosclerotic heart disease Atherosclerotic heart disease is stable. The 8/22 CT coronary angiogram   revealed a total coronary calcium score of 898 Agatston  units representing 90 percentile. The left main had less than 25% stenosis LAD proximal 30-49% mid 50-69% and distal 30-49% left circumflex and RCA had 1-29% proximal stenosis.  FFR (fractional flow reserve) was negative for all vessels.  The resting  9/24  electrocardiogram shows no evidence of myocardial ischemia or infarction.  Left ventricular  systolic function is  normal  as reflected by a left ventricular ejection fraction of  56 % on the  9/24  echocardiogram. . In view of the lack of angina, above noninvasive studies and clinical course continued medical management is indicated at this time. Measures to control modifiable risk factors for the development of atherosclerotic disease will be important in long-term management.   I have recommended the following a. Risk factor modification b. Continue  the present medical regimen  c. No further cardiac testing for this problem at this time    Valvular heart disease /Aortic stenosis  The 9/23 echocardiogram revealed moderate aortic stenosis.  The peak gradient was 42 mmHg the mean gradient 22 mmHg and aortic valve area 0.9 cm.  .  The left ventricular ejection fraction was 53%.  The 9/24 echocardiogram again  showed  moderate  aortic stenosis.  The peak gradient was 62 mmHg mean gradient 36 mmHg and aortic valve area of 1.03  cm.    The pulmonary artery systolic pressure was normal at 15 mmHg.  The left ventricular ejection fraction was 56%. The present cardiac physical examination is consistent with this degree of aortic stenosis. . In view of the lack of symptoms, absence of heart failure and clinical course,  continued monitoring without intervention is indicated at this time  I have recommended the following a. No further cardiac testing for this problem at this time      Hyperlipidemia Hyperlipidemia represents a risk factor for progressive  atherosclerotic heart disease. The target LDL level with known atherosclerotic heart disease  is  about 70  [facility] [employer] a reductase inhibitor therapy has been effective.  In 9/23 the serum cholesterol level was 163 triglycerides 132 HDL 48 and LDL 91.More recent lipid levels are not available for review  The dose of atorvastatin may be increased if required to obtain more optimal LDL levels.  Nonpharmacological therapy, specifically diet exercise and weight loss are emphasized  as major aspects of treatment.  .  I have recommended the following a. Low fat low cholesterol diet. Regular aerobic exercise and weight loss b.  Target LDL level to about 70 as discussed above  c   Continue the present medical regimen at this time d Routine laboratory studies including lipid profile through primary care Dr. VIVIANE martin   Increase atorvastatin dose if required to obtain more optimal LDL levels as  assessed by the next laboratory studies  .  Second-degree AV block/Ectopic atrial rhythm/  first-degree AV block-prolonged AV conduction The 10/3/24 electrocardiogram  revealed sinus rhythm with prolonged AV conduction and second-degree AV block.  In the past the patient has had a long first-degree AV block with a IA interval of 320 ms but no evidence of heart block.   A 10/23 Zio patch 2-week mobile telemetry study showed sinus rhythm /minute.  Second-degree Mobitz 1 AV block was noted during hours of sleep.  No symptoms were recorded.  No arrhythmia was seen.  Findings of conduction abnormalities in the setting of aortic valve disease is not uncommon. The 5/21/24 electrocardiogram  reveals an ectopic atrial rhythm representing a new benign rhythm disturbance requiring no intervention. 9/30/2024 electrocardiogram shows sinus rhythm with prolonged AV conduction /first-degree AV block Permanent pacemaker implantation is not indicated in the absence of symptomatic bradycardia or high degree AV block.  I have recommended the following No further cardiac testing for this problem at this time Avoidance of AV ruperto blocking agents e.g. beta-blockers and certain calcium entry blocker therapy(diltiazem/verapamil)     The diagnosis, prognosis, risks, options and alternatives were explained at length to the patient .   All questions were answered. Issues discussed included laboratory test abnormalities atherosclerotic heart disease   coronary revascularization procedures aortic valve replacement  hyperlipidemia family history of heart disease  aortic stenosis   noninvasive cardiac testing diet and exercise. . Counseling and/or coordination of care Time was a significant factor for this patient encounter. Total time spent with the patient was  30   minutes. Greater than 50% of the time was devoted  to counseling and/or coordination of care.

## 2024-10-05 NOTE — PHYSICAL EXAM
[Normal Conjunctiva] : normal conjunctiva [Normal S1, S2] : normal S1, S2 [Clear Lung Fields] : clear lung fields [Soft] : abdomen soft [Non Tender] : non-tender [Normal Bowel Sounds] : normal bowel sounds [Normal Gait] : normal gait [No Rash] : no rash [No Focal Deficits] : no focal deficits [de-identified] : Appears in no distress lying flat [de-identified] : normocephalic [de-identified] : No neck vein distention. No carotid bruit [de-identified] : 1-2 /6  mid-peaking  systolic ejection murmur at the base. No gallop  no diastolic sounds. [de-identified] : Full range of motion [de-identified] : No edema. Dorsalis pedis pulses +2 bilaterally. Feet warm and well-perfused. No ulcerations [de-identified] : pleasant

## 2024-10-05 NOTE — PHYSICAL EXAM
[Normal Conjunctiva] : normal conjunctiva [Normal S1, S2] : normal S1, S2 [Clear Lung Fields] : clear lung fields [Soft] : abdomen soft [Non Tender] : non-tender [Normal Bowel Sounds] : normal bowel sounds [Normal Gait] : normal gait [No Rash] : no rash [No Focal Deficits] : no focal deficits [de-identified] : Appears in no distress lying flat [de-identified] : normocephalic [de-identified] : No neck vein distention. No carotid bruit [de-identified] : 1-2 /6  mid-peaking  systolic ejection murmur at the base. No gallop  no diastolic sounds. [de-identified] : Full range of motion [de-identified] : No edema. Dorsalis pedis pulses +2 bilaterally. Feet warm and well-perfused. No ulcerations [de-identified] : pleasant

## 2024-10-05 NOTE — HISTORY OF PRESENT ILLNESS
[FreeTextEntry1] : 79-year-old man Routine follow-up for atherosclerotic/valvular heart disease aortic stenosis second-degree AV block hyperlipidemia.  "I feel okay."  Doe specifically denies chest pain, palpitations, dizziness or syncope.  He is physically active without restriction or limitation.

## 2024-10-05 NOTE — DISCUSSION/SUMMARY
[FreeTextEntry1] : Atherosclerotic heart disease Atherosclerotic heart disease is stable. The 8/22 CT coronary angiogram   revealed a total coronary calcium score of 898 Agatston  units representing 90 percentile. The left main had less than 25% stenosis LAD proximal 30-49% mid 50-69% and distal 30-49% left circumflex and RCA had 1-29% proximal stenosis.  FFR (fractional flow reserve) was negative for all vessels.  The resting  9/24  electrocardiogram shows no evidence of myocardial ischemia or infarction.  Left ventricular  systolic function is  normal  as reflected by a left ventricular ejection fraction of  56 % on the  9/24  echocardiogram. . In view of the lack of angina, above noninvasive studies and clinical course continued medical management is indicated at this time. Measures to control modifiable risk factors for the development of atherosclerotic disease will be important in long-term management.   I have recommended the following a. Risk factor modification b. Continue  the present medical regimen  c. No further cardiac testing for this problem at this time    Valvular heart disease /Aortic stenosis  The 9/23 echocardiogram revealed moderate aortic stenosis.  The peak gradient was 42 mmHg the mean gradient 22 mmHg and aortic valve area 0.9 cm.  .  The left ventricular ejection fraction was 53%.  The 9/24 echocardiogram again  showed  moderate  aortic stenosis.  The peak gradient was 62 mmHg mean gradient 36 mmHg and aortic valve area of 1.03  cm.    The pulmonary artery systolic pressure was normal at 15 mmHg.  The left ventricular ejection fraction was 56%. The present cardiac physical examination is consistent with this degree of aortic stenosis. . In view of the lack of symptoms, absence of heart failure and clinical course,  continued monitoring without intervention is indicated at this time  I have recommended the following a. No further cardiac testing for this problem at this time      Hyperlipidemia Hyperlipidemia represents a risk factor for progressive  atherosclerotic heart disease. The target LDL level with known atherosclerotic heart disease  is  about 70  [facility] [employer] a reductase inhibitor therapy has been effective.  In 9/23 the serum cholesterol level was 163 triglycerides 132 HDL 48 and LDL 91.More recent lipid levels are not available for review  The dose of atorvastatin may be increased if required to obtain more optimal LDL levels.  Nonpharmacological therapy, specifically diet exercise and weight loss are emphasized  as major aspects of treatment.  .  I have recommended the following a. Low fat low cholesterol diet. Regular aerobic exercise and weight loss b.  Target LDL level to about 70 as discussed above  c   Continue the present medical regimen at this time d Routine laboratory studies including lipid profile through primary care Dr. VIVIANE martin   Increase atorvastatin dose if required to obtain more optimal LDL levels as  assessed by the next laboratory studies  .  Second-degree AV block/Ectopic atrial rhythm/  first-degree AV block-prolonged AV conduction The 10/3/24 electrocardiogram  revealed sinus rhythm with prolonged AV conduction and second-degree AV block.  In the past the patient has had a long first-degree AV block with a AK interval of 320 ms but no evidence of heart block.   A 10/23 Zio patch 2-week mobile telemetry study showed sinus rhythm /minute.  Second-degree Mobitz 1 AV block was noted during hours of sleep.  No symptoms were recorded.  No arrhythmia was seen.  Findings of conduction abnormalities in the setting of aortic valve disease is not uncommon. The 5/21/24 electrocardiogram  reveals an ectopic atrial rhythm representing a new benign rhythm disturbance requiring no intervention. 9/30/2024 electrocardiogram shows sinus rhythm with prolonged AV conduction /first-degree AV block Permanent pacemaker implantation is not indicated in the absence of symptomatic bradycardia or high degree AV block.  I have recommended the following No further cardiac testing for this problem at this time Avoidance of AV ruperto blocking agents e.g. beta-blockers and certain calcium entry blocker therapy(diltiazem/verapamil)     The diagnosis, prognosis, risks, options and alternatives were explained at length to the patient .   All questions were answered. Issues discussed included laboratory test abnormalities atherosclerotic heart disease   coronary revascularization procedures aortic valve replacement  hyperlipidemia family history of heart disease  aortic stenosis   noninvasive cardiac testing diet and exercise. . Counseling and/or coordination of care Time was a significant factor for this patient encounter. Total time spent with the patient was  30   minutes. Greater than 50% of the time was devoted  to counseling and/or coordination of care.

## 2025-05-13 ENCOUNTER — APPOINTMENT (OUTPATIENT)
Dept: CARDIOLOGY | Facility: CLINIC | Age: 80
End: 2025-05-13
Payer: MEDICARE

## 2025-05-13 ENCOUNTER — NON-APPOINTMENT (OUTPATIENT)
Age: 80
End: 2025-05-13

## 2025-05-13 VITALS
RESPIRATION RATE: 16 BRPM | TEMPERATURE: 98.2 F | HEIGHT: 71.5 IN | WEIGHT: 215 LBS | DIASTOLIC BLOOD PRESSURE: 69 MMHG | SYSTOLIC BLOOD PRESSURE: 111 MMHG | HEART RATE: 57 BPM | BODY MASS INDEX: 29.44 KG/M2 | OXYGEN SATURATION: 98 %

## 2025-05-13 DIAGNOSIS — E78.5 HYPERLIPIDEMIA, UNSPECIFIED: ICD-10-CM

## 2025-05-13 DIAGNOSIS — I25.10 ATHEROSCLEROTIC HEART DISEASE OF NATIVE CORONARY ARTERY W/OUT ANGINA PECTORIS: ICD-10-CM

## 2025-05-13 DIAGNOSIS — I35.0 NONRHEUMATIC AORTIC (VALVE) STENOSIS: ICD-10-CM

## 2025-05-13 DIAGNOSIS — I49.1 ATRIAL PREMATURE DEPOLARIZATION: ICD-10-CM

## 2025-05-13 DIAGNOSIS — I44.0 ATRIOVENTRICULAR BLOCK, FIRST DEGREE: ICD-10-CM

## 2025-05-13 DIAGNOSIS — I44.1 ATRIOVENTRICULAR BLOCK, SECOND DEGREE: ICD-10-CM

## 2025-05-13 DIAGNOSIS — Z63.4 DISAPPEARANCE AND DEATH OF FAMILY MEMBER: ICD-10-CM

## 2025-05-13 DIAGNOSIS — I38 ENDOCARDITIS, VALVE UNSPECIFIED: ICD-10-CM

## 2025-05-13 PROCEDURE — 93246 EXT ECG>7D<15D RECORDING: CPT

## 2025-05-13 PROCEDURE — 93000 ELECTROCARDIOGRAM COMPLETE: CPT | Mod: 59

## 2025-05-13 PROCEDURE — 99213 OFFICE O/P EST LOW 20 MIN: CPT

## 2025-05-13 PROCEDURE — G2211 COMPLEX E/M VISIT ADD ON: CPT

## 2025-05-13 SDOH — SOCIAL STABILITY - SOCIAL INSECURITY: DISSAPEARANCE AND DEATH OF FAMILY MEMBER: Z63.4

## 2025-05-14 ENCOUNTER — NON-APPOINTMENT (OUTPATIENT)
Age: 80
End: 2025-05-14

## 2025-06-16 PROCEDURE — 93248 EXT ECG>7D<15D REV&INTERPJ: CPT

## 2025-09-08 ENCOUNTER — APPOINTMENT (OUTPATIENT)
Dept: CARDIOLOGY | Facility: CLINIC | Age: 80
End: 2025-09-08
Payer: MEDICARE

## 2025-09-08 DIAGNOSIS — Z86.79 PERSONAL HISTORY OF OTHER DISEASES OF THE CIRCULATORY SYSTEM: ICD-10-CM

## 2025-09-08 PROCEDURE — 93306 TTE W/DOPPLER COMPLETE: CPT

## 2025-09-10 ENCOUNTER — NON-APPOINTMENT (OUTPATIENT)
Age: 80
End: 2025-09-10

## 2025-09-15 ENCOUNTER — NON-APPOINTMENT (OUTPATIENT)
Age: 80
End: 2025-09-15

## 2025-09-15 ENCOUNTER — APPOINTMENT (OUTPATIENT)
Dept: CARDIOLOGY | Facility: CLINIC | Age: 80
End: 2025-09-15
Payer: MEDICARE

## 2025-09-15 VITALS
SYSTOLIC BLOOD PRESSURE: 130 MMHG | OXYGEN SATURATION: 97 % | BODY MASS INDEX: 29.99 KG/M2 | HEART RATE: 89 BPM | HEIGHT: 71.56 IN | WEIGHT: 219 LBS | DIASTOLIC BLOOD PRESSURE: 66 MMHG

## 2025-09-15 DIAGNOSIS — I44.0 ATRIOVENTRICULAR BLOCK, FIRST DEGREE: ICD-10-CM

## 2025-09-15 DIAGNOSIS — Z86.79 PERSONAL HISTORY OF OTHER DISEASES OF THE CIRCULATORY SYSTEM: ICD-10-CM

## 2025-09-15 DIAGNOSIS — I38 ENDOCARDITIS, VALVE UNSPECIFIED: ICD-10-CM

## 2025-09-15 DIAGNOSIS — E78.5 HYPERLIPIDEMIA, UNSPECIFIED: ICD-10-CM

## 2025-09-15 DIAGNOSIS — I44.7 LEFT BUNDLE-BRANCH BLOCK, UNSPECIFIED: ICD-10-CM

## 2025-09-15 DIAGNOSIS — I49.1 ATRIAL PREMATURE DEPOLARIZATION: ICD-10-CM

## 2025-09-15 DIAGNOSIS — I25.10 ATHEROSCLEROTIC HEART DISEASE OF NATIVE CORONARY ARTERY W/OUT ANGINA PECTORIS: ICD-10-CM

## 2025-09-15 DIAGNOSIS — I44.1 ATRIOVENTRICULAR BLOCK, SECOND DEGREE: ICD-10-CM

## 2025-09-15 DIAGNOSIS — R07.9 CHEST PAIN, UNSPECIFIED: ICD-10-CM

## 2025-09-15 DIAGNOSIS — I35.0 NONRHEUMATIC AORTIC (VALVE) STENOSIS: ICD-10-CM

## 2025-09-15 PROCEDURE — G2211 COMPLEX E/M VISIT ADD ON: CPT

## 2025-09-15 PROCEDURE — 99214 OFFICE O/P EST MOD 30 MIN: CPT

## 2025-09-15 PROCEDURE — 93000 ELECTROCARDIOGRAM COMPLETE: CPT

## 2025-09-21 PROBLEM — Z86.79 HISTORY OF SECOND DEGREE HEART BLOCK: Status: RESOLVED | Noted: 2024-05-27 | Resolved: 2025-09-21

## 2025-09-21 PROBLEM — R07.9 CHEST PAIN, UNSPECIFIED TYPE: Status: ACTIVE | Noted: 2025-09-21

## 2025-09-21 PROBLEM — I44.7 LEFT BUNDLE BRANCH BLOCK: Status: ACTIVE | Noted: 2025-09-21
